# Patient Record
Sex: MALE | Race: BLACK OR AFRICAN AMERICAN | NOT HISPANIC OR LATINO | URBAN - METROPOLITAN AREA
[De-identification: names, ages, dates, MRNs, and addresses within clinical notes are randomized per-mention and may not be internally consistent; named-entity substitution may affect disease eponyms.]

---

## 2023-02-02 ENCOUNTER — SOCIAL WORK (OUTPATIENT)
Dept: ADMINISTRATIVE | Facility: OTHER | Age: 43
End: 2023-02-02

## 2023-02-02 NOTE — PROGRESS NOTES
Sw received a referral to assist with [facility] resources. [facility] had received a consult to see Patient. After reviewing the referral, it was recommended Patient to return to services he receives at [facility]. Sw mailed Patient the contact information for [facility] [place]. He was encouraged to contact them to restart services.    Calista Sawyer LCSW    [phone]

## 2023-05-19 PROBLEM — F15.90 STIMULANT USE DISORDER: Status: ACTIVE | Noted: 2023-05-19

## 2023-05-19 PROBLEM — F12.90 CANNABIS USE DISORDER: Status: ACTIVE | Noted: 2023-05-19

## 2023-05-19 PROBLEM — F10.90 ALCOHOL USE DISORDER: Status: ACTIVE | Noted: 2023-05-19

## 2023-05-19 PROBLEM — F17.200 TOBACCO USE DISORDER: Status: ACTIVE | Noted: 2023-05-19

## 2023-05-19 PROBLEM — F13.10 SEDATIVE, HYPNOTIC OR ANXIOLYTIC USE DISORDER, MILD, ABUSE: Status: ACTIVE | Noted: 2023-05-19

## 2023-05-19 PROBLEM — F20.9 SCHIZOPHRENIA: Status: ACTIVE | Noted: 2023-05-19

## 2023-10-19 PROBLEM — F25.1 SCHIZOAFFECTIVE DISORDER, DEPRESSIVE TYPE: Status: ACTIVE | Noted: 2023-05-19
